# Patient Record
Sex: FEMALE | Race: WHITE | URBAN - METROPOLITAN AREA
[De-identification: names, ages, dates, MRNs, and addresses within clinical notes are randomized per-mention and may not be internally consistent; named-entity substitution may affect disease eponyms.]

---

## 2019-03-02 ENCOUNTER — OFFICE (OUTPATIENT)
Dept: URBAN - METROPOLITAN AREA CLINIC 12 | Facility: CLINIC | Age: 19
Setting detail: OPHTHALMOLOGY
End: 2019-03-02

## 2019-03-02 DIAGNOSIS — E11.9: ICD-10-CM

## 2019-03-02 PROCEDURE — 92004 COMPRE OPH EXAM NEW PT 1/>: CPT | Performed by: OPHTHALMOLOGY

## 2019-03-02 ASSESSMENT — REFRACTION_MANIFEST
OS_VA2: 20/
OS_VA2: 20/
OD_VA2: 20/
OS_VA3: 20/
OU_VA: 20/
OS_VA1: 20/
OU_VA: 20/
OS_VA3: 20/
OD_VA3: 20/
OD_VA1: 20/
OD_VA3: 20/
OD_VA2: 20/
OS_VA1: 20/
OD_VA1: 20/

## 2019-03-02 ASSESSMENT — REFRACTION_CURRENTRX
OD_OVR_VA: 20/
OD_OVR_VA: 20/
OS_CYLINDER: -2.00
OD_SPHERE: -0.50
OD_OVR_VA: 20/
OS_OVR_VA: 20/
OD_CYLINDER: -1.75
OD_AXIS: 003
OS_OVR_VA: 20/
OS_VPRISM_DIRECTION: SV
OS_SPHERE: PL
OS_OVR_VA: 20/
OD_VPRISM_DIRECTION: SV
OS_AXIS: 180

## 2019-03-02 ASSESSMENT — REFRACTION_AUTOREFRACTION
OD_CYLINDER: -1.75
OS_SPHERE: PLANO
OS_AXIS: 174
OD_AXIS: 007
OD_SPHERE: -0.50
OS_CYLINDER: -2.25

## 2019-03-02 ASSESSMENT — SPHEQUIV_DERIVED: OD_SPHEQUIV: -1.375

## 2019-03-02 ASSESSMENT — KERATOMETRY
OD_AXISANGLE_DEGREES: 088
OD_K2POWER_DIOPTERS: 46.50
OS_K2POWER_DIOPTERS: 46.75
OS_K1POWER_DIOPTERS: 44.00
OD_K1POWER_DIOPTERS: 44.25
OS_AXISANGLE_DEGREES: 086

## 2019-03-02 ASSESSMENT — VISUAL ACUITY
OD_BCVA: 20/20-1
OS_BCVA: 20/20-1

## 2019-03-02 ASSESSMENT — AXIALLENGTH_DERIVED: OD_AL: 23.4399

## 2019-03-02 ASSESSMENT — CONFRONTATIONAL VISUAL FIELD TEST (CVF)
OD_FINDINGS: FULL
OS_FINDINGS: FULL

## 2022-12-15 ENCOUNTER — APPOINTMENT (RX ONLY)
Dept: URBAN - METROPOLITAN AREA CLINIC 154 | Facility: CLINIC | Age: 22
Setting detail: DERMATOLOGY
End: 2022-12-15

## 2022-12-15 DIAGNOSIS — Z41.9 ENCOUNTER FOR PROCEDURE FOR PURPOSES OTHER THAN REMEDYING HEALTH STATE, UNSPECIFIED: ICD-10-CM

## 2022-12-15 PROCEDURE — ? HYDRAFACIAL

## 2022-12-15 NOTE — PROCEDURE: HYDRAFACIAL
Vacuum Pressure Low Setting (Will Not Render If Set To 0): 22
Vacuum Pressure High Setting (Will Not Render If Set To 0): 0
Solution Override: Lip Perk Solution
Number Of Passes: 1
Tip Override
Solution: GlySal 15%
Solution Override
Indication: acne
Tip: Hydropeel Tip, Blue
Procedure: Extraction
Tip: Hydropeel Tip, Teal
Vacuum Pressure Low Setting (Will Not Render If Set To 0): 22
Vacuum Pressure Low Setting (Will Not Render If Set To 0): 13
Procedure: Exfoliation
Vacuum Pressure Low Setting (Will Not Render If Set To 0): 12
Number Of Passes: 2
Tip Override: Lip Perk Hydropeel Tip
Location: face
Solution: Activ-4
Consent: Written consent obtained, risks reviewed including but not limited to crusting, scabbing, blistering, scarring, darker or lighter pigmentary change, bruising, and/or incomplete response.
Tip: Hydropeel Tip, Clear
Solution: GlySal 7.5%
Procedure: Peel
Vacuum Pressure Low Setting (Will Not Render If Set To 0): 13
Post-Care Instructions: I reviewed with the patient in detail post-care instructions. Patient should stay away from the sun and wear sun protection until treated areas are fully healed.
Procedure: Fusion